# Patient Record
Sex: MALE | Race: BLACK OR AFRICAN AMERICAN
[De-identification: names, ages, dates, MRNs, and addresses within clinical notes are randomized per-mention and may not be internally consistent; named-entity substitution may affect disease eponyms.]

---

## 2020-01-23 ENCOUNTER — HOSPITAL ENCOUNTER (OUTPATIENT)
Dept: HOSPITAL 53 - M RAD | Age: 70
End: 2020-01-23
Attending: SURGERY
Payer: COMMERCIAL

## 2020-01-23 DIAGNOSIS — M25.572: Primary | ICD-10-CM

## 2020-01-23 DIAGNOSIS — I73.9: ICD-10-CM

## 2020-01-24 NOTE — REP
Clinical:  Ankle pain.

 

Technique:  AP, lateral, bilateral oblique views of the left ankle.

 

Findings:

Generalized age-related osteopenia and degenerative changes are appreciated.

Small cortication is at the lateral malleolus suggest old injury.  The ankle

mortise appears relatively intact and normal.

 

Impression:  Generalized age-related changes.  Presumed old lateral malleolus

injury.

 

 

Electronically Signed by

Steven Hodges MD 01/24/2020 01:52 A

## 2020-01-24 NOTE — REP
Clinical:  Pain.

 

Technique:  AP, lateral, bilateral oblique views of the left foot.

 

Findings:

Mild osteopenia and generalized age-related changes are appreciated.  No overt

osteoarthritic findings.  No acute fracture dislocation.  Surrounding soft

tissues are grossly unremarkable.  No subcutaneous emphysema or foreign body.

 

Impression:  Generalized age-related changes.

 

 

Electronically Signed by

Steven Hodges MD 01/24/2020 01:56 A